# Patient Record
Sex: FEMALE | Race: WHITE | NOT HISPANIC OR LATINO | Employment: FULL TIME | URBAN - METROPOLITAN AREA
[De-identification: names, ages, dates, MRNs, and addresses within clinical notes are randomized per-mention and may not be internally consistent; named-entity substitution may affect disease eponyms.]

---

## 2023-03-11 ENCOUNTER — HOSPITAL ENCOUNTER (EMERGENCY)
Facility: HOSPITAL | Age: 58
Discharge: HOME | End: 2023-03-11
Admitting: EMERGENCY MEDICINE

## 2023-03-11 ENCOUNTER — APPOINTMENT (EMERGENCY)
Dept: CT IMAGING | Facility: HOSPITAL | Age: 58
End: 2023-03-11

## 2023-03-11 VITALS
RESPIRATION RATE: 16 BRPM | HEIGHT: 61 IN | SYSTOLIC BLOOD PRESSURE: 168 MMHG | TEMPERATURE: 97.6 F | WEIGHT: 169.09 LBS | BODY MASS INDEX: 31.93 KG/M2 | OXYGEN SATURATION: 99 % | HEART RATE: 66 BPM | DIASTOLIC BLOOD PRESSURE: 67 MMHG

## 2023-03-11 DIAGNOSIS — R42 VERTIGO: Primary | ICD-10-CM

## 2023-03-11 DIAGNOSIS — E86.0 DEHYDRATION: ICD-10-CM

## 2023-03-11 DIAGNOSIS — R11.2 NAUSEA AND VOMITING: ICD-10-CM

## 2023-03-11 LAB
ANION GAP SERPL CALCULATED.3IONS-SCNC: 13 MMOL/L (ref 4–13)
ATRIAL RATE: 59 BPM
ATRIAL RATE: 76 BPM
BASOPHILS # BLD AUTO: 0.07 THOUSANDS/ÂΜL (ref 0–0.1)
BASOPHILS NFR BLD AUTO: 0 % (ref 0–1)
BUN SERPL-MCNC: 30 MG/DL (ref 5–25)
CALCIUM SERPL-MCNC: 9.7 MG/DL (ref 8.4–10.2)
CARDIAC TROPONIN I PNL SERPL HS: 2 NG/L
CHLORIDE SERPL-SCNC: 106 MMOL/L (ref 96–108)
CO2 SERPL-SCNC: 20 MMOL/L (ref 21–32)
CREAT SERPL-MCNC: 1.18 MG/DL (ref 0.6–1.3)
EOSINOPHIL # BLD AUTO: 0.02 THOUSAND/ÂΜL (ref 0–0.61)
EOSINOPHIL NFR BLD AUTO: 0 % (ref 0–6)
ERYTHROCYTE [DISTWIDTH] IN BLOOD BY AUTOMATED COUNT: 12.2 % (ref 11.6–15.1)
GFR SERPL CREATININE-BSD FRML MDRD: 50 ML/MIN/1.73SQ M
GLUCOSE SERPL-MCNC: 120 MG/DL (ref 65–140)
HCT VFR BLD AUTO: 36.5 % (ref 34.8–46.1)
HGB BLD-MCNC: 11.9 G/DL (ref 11.5–15.4)
IMM GRANULOCYTES # BLD AUTO: 0.09 THOUSAND/UL (ref 0–0.2)
IMM GRANULOCYTES NFR BLD AUTO: 1 % (ref 0–2)
LYMPHOCYTES # BLD AUTO: 1.42 THOUSANDS/ÂΜL (ref 0.6–4.47)
LYMPHOCYTES NFR BLD AUTO: 9 % (ref 14–44)
MCH RBC QN AUTO: 31.1 PG (ref 26.8–34.3)
MCHC RBC AUTO-ENTMCNC: 32.6 G/DL (ref 31.4–37.4)
MCV RBC AUTO: 95 FL (ref 82–98)
MONOCYTES # BLD AUTO: 0.81 THOUSAND/ÂΜL (ref 0.17–1.22)
MONOCYTES NFR BLD AUTO: 5 % (ref 4–12)
NEUTROPHILS # BLD AUTO: 14.03 THOUSANDS/ÂΜL (ref 1.85–7.62)
NEUTS SEG NFR BLD AUTO: 85 % (ref 43–75)
NRBC BLD AUTO-RTO: 0 /100 WBCS
P AXIS: 49 DEGREES
P AXIS: 49 DEGREES
PLATELET # BLD AUTO: 313 THOUSANDS/UL (ref 149–390)
PMV BLD AUTO: 10.2 FL (ref 8.9–12.7)
POTASSIUM SERPL-SCNC: 3.4 MMOL/L (ref 3.5–5.3)
PR INTERVAL: 164 MS
PR INTERVAL: 176 MS
QRS AXIS: 77 DEGREES
QRS AXIS: 84 DEGREES
QRSD INTERVAL: 92 MS
QRSD INTERVAL: 94 MS
QT INTERVAL: 408 MS
QT INTERVAL: 488 MS
QTC INTERVAL: 459 MS
QTC INTERVAL: 483 MS
RBC # BLD AUTO: 3.83 MILLION/UL (ref 3.81–5.12)
SODIUM SERPL-SCNC: 139 MMOL/L (ref 135–147)
T WAVE AXIS: 41 DEGREES
T WAVE AXIS: 63 DEGREES
VENTRICULAR RATE: 59 BPM
VENTRICULAR RATE: 76 BPM
WBC # BLD AUTO: 16.44 THOUSAND/UL (ref 4.31–10.16)

## 2023-03-11 RX ORDER — HYDROCHLOROTHIAZIDE 25 MG/1
25 TABLET ORAL DAILY
COMMUNITY
Start: 2022-11-11 | End: 2023-11-11

## 2023-03-11 RX ORDER — LISINOPRIL 20 MG/1
20 TABLET ORAL DAILY
COMMUNITY
Start: 2022-11-11

## 2023-03-11 RX ORDER — ONDANSETRON 2 MG/ML
1 INJECTION INTRAMUSCULAR; INTRAVENOUS ONCE
Status: COMPLETED | OUTPATIENT
Start: 2023-03-11 | End: 2023-03-11

## 2023-03-11 RX ORDER — MECLIZINE HYDROCHLORIDE 25 MG/1
25 TABLET ORAL 3 TIMES DAILY PRN
Qty: 30 TABLET | Refills: 0 | Status: SHIPPED | OUTPATIENT
Start: 2023-03-11

## 2023-03-11 RX ORDER — ONDANSETRON 4 MG/1
4 TABLET, ORALLY DISINTEGRATING ORAL EVERY 6 HOURS PRN
Qty: 20 TABLET | Refills: 0 | Status: SHIPPED | OUTPATIENT
Start: 2023-03-11

## 2023-03-11 RX ORDER — ATORVASTATIN CALCIUM 20 MG/1
20 TABLET, FILM COATED ORAL DAILY
COMMUNITY
Start: 2022-11-11 | End: 2023-11-11

## 2023-03-11 RX ADMIN — SODIUM CHLORIDE 1000 ML: 0.9 INJECTION, SOLUTION INTRAVENOUS at 10:22

## 2023-03-11 NOTE — ED PROVIDER NOTES
History  Chief Complaint   Patient presents with   • Dizziness     Woke up at 6am with dizziness "states room was spinning" and then started vomiting  Given Zofran in ambulance  EMS states "no neuro deficits"     59 y o  female  with past medical history significant for hypertension presents to ED via EMS with chief complaint of dizziness and vomiting  Onset of symptoms reported as sudden, 6 am this morning  Location of symptoms reported as head  Quality is reported as dizziness, described as sensation of room spinning around followed by 2 episodes of vomiting  Severity is reported as initially moderate, currently mild  Associated symptoms: positive nausea, positive vomiting, positive dizziness  Denies palpitations, denies chest pain, denies sob, denies diarrhea, denies fevers, denies blurred vision or loss of vision, denies sinus pressure or congestion, denies sore throat or ear pain  Modifying factors: dizziness symptoms improved after vomiting and remaining still for a period of time  Context: Denies recent fall injury or trauma  No blood thinner use  Reports a similar episode 3 months ago for which she did not seek any medical evaluation and which resolved spontaneously  Social history reviewed: Remarkable for cigarette smoking  Denies alcohol use , past surgical history reviewed remarkable for hysterectomy  History provided by:  Patient and EMS personnel   used: No        Prior to Admission Medications   Prescriptions Last Dose Informant Patient Reported?  Taking?   atorvastatin (LIPITOR) 20 mg tablet   Yes Yes   Sig: Take 20 mg by mouth daily   hydrochlorothiazide (HYDRODIURIL) 25 mg tablet   Yes Yes   Sig: Take 25 mg by mouth daily   lisinopril (ZESTRIL) 20 mg tablet   Yes Yes   Sig: Take 20 mg by mouth daily      Facility-Administered Medications: None       Past Medical History:   Diagnosis Date   • Hypertension        Past Surgical History:   Procedure Laterality Date • HYSTERECTOMY         History reviewed  No pertinent family history  I have reviewed and agree with the history as documented  E-Cigarette/Vaping     E-Cigarette/Vaping Substances     Social History     Tobacco Use   • Smoking status: Former     Types: Cigarettes   • Smokeless tobacco: Never   Substance Use Topics   • Alcohol use: Not Currently   • Drug use: Never       Review of Systems   Constitutional: Negative for diaphoresis and fever  HENT: Negative for congestion  Eyes: Negative for photophobia and visual disturbance  Respiratory: Negative for cough, chest tightness, shortness of breath and stridor  Cardiovascular: Negative for chest pain  Gastrointestinal: Positive for nausea and vomiting  Negative for abdominal distention, abdominal pain, constipation and diarrhea  Musculoskeletal: Negative for gait problem  Skin: Negative for rash  Neurological: Positive for dizziness and light-headedness  Negative for seizures, syncope, facial asymmetry, weakness and numbness  All other systems reviewed and are negative  Physical Exam  Physical Exam  Vitals and nursing note reviewed  Constitutional:       General: She is not in acute distress  Appearance: Normal appearance  Comments: /69   Pulse 65   Temp 97 6 °F (36 4 °C) (Oral)   Resp 20   Ht 5' 1" (1 549 m)   Wt 76 7 kg (169 lb 1 5 oz)   SpO2 98%   BMI 31 95 kg/m²      HENT:      Head: Normocephalic and atraumatic  Right Ear: Tympanic membrane, ear canal and external ear normal       Left Ear: Tympanic membrane, ear canal and external ear normal       Nose: Nose normal       Mouth/Throat:      Mouth: Mucous membranes are moist       Pharynx: No oropharyngeal exudate or posterior oropharyngeal erythema  Eyes:      General: No scleral icterus  Right eye: No discharge  Left eye: No discharge  Conjunctiva/sclera: Conjunctivae normal       Comments: Fatigable right gaze nystagmus  Cardiovascular:      Rate and Rhythm: Regular rhythm  Bradycardia present  Pulses: Normal pulses  Pulmonary:      Effort: Pulmonary effort is normal  No respiratory distress  Breath sounds: Normal breath sounds  No stridor  No rhonchi  Abdominal:      Palpations: There is no mass  Tenderness: There is no abdominal tenderness  There is no guarding or rebound  Musculoskeletal:         General: No tenderness, deformity or signs of injury  Normal range of motion  Cervical back: Normal range of motion and neck supple  No rigidity or tenderness  Skin:     General: Skin is dry  Capillary Refill: Capillary refill takes less than 2 seconds  Coloration: Skin is not jaundiced  Findings: No erythema or rash  Neurological:      General: No focal deficit present  Mental Status: She is alert and oriented to person, place, and time  Mental status is at baseline  Cranial Nerves: No cranial nerve deficit  Sensory: No sensory deficit  Motor: No weakness  Gait: Gait normal    Psychiatric:         Mood and Affect: Mood normal          Behavior: Behavior normal          Thought Content:  Thought content normal          Vital Signs  ED Triage Vitals   Temperature Pulse Respirations Blood Pressure SpO2   03/11/23 0943 03/11/23 0943 03/11/23 0943 03/11/23 0943 03/11/23 0943   97 6 °F (36 4 °C) 59 16 (!) 175/74 100 %      Temp Source Heart Rate Source Patient Position - Orthostatic VS BP Location FiO2 (%)   03/11/23 0943 03/11/23 1104 03/11/23 1104 03/11/23 1104 --   Oral Monitor Lying Right arm       Pain Score       --                  Vitals:    03/11/23 0943 03/11/23 0945 03/11/23 1000 03/11/23 1104   BP: (!) 175/74 (!) 175/74 168/69 162/72   Pulse: 59 60 65 69   Patient Position - Orthostatic VS:    Lying         Visual Acuity  Visual Acuity    Flowsheet Row Most Recent Value   L Pupil Size (mm) 2   R Pupil Size (mm) 2          ED Medications  Medications ondansetron (FOR EMS ONLY) (ZOFRAN) 4 mg/2 mL injection 4 mg (0 mg Does not apply Given to EMS 3/11/23 0945)   sodium chloride 0 9 % bolus 1,000 mL (1,000 mL Intravenous New Bag 3/11/23 1022)       Diagnostic Studies  Results Reviewed     Procedure Component Value Units Date/Time    HS Troponin I 2hr [[id number]]     Lab Status: No result Specimen: Blood     HS Troponin 0hr (reflex protocol) [[id number]]  (Normal) Collected: 03/11/23 1019    Lab Status: Final result Specimen: Blood from Arm, Right Updated: 03/11/23 1058     hs TnI 0hr 2 ng/L     Basic metabolic panel [[id number]]  (Abnormal) Collected: 03/11/23 1019    Lab Status: Final result Specimen: Blood from Arm, Right Updated: 03/11/23 1050     Sodium 139 mmol/L      Potassium 3 4 mmol/L      Chloride 106 mmol/L      CO2 20 mmol/L      ANION GAP 13 mmol/L      BUN 30 mg/dL      Creatinine 1 18 mg/dL      Glucose 120 mg/dL      Calcium 9 7 mg/dL      eGFR 50 ml/min/1 73sq m     Narrative:      [place] guidelines for Chronic Kidney Disease (CKD):   •  Stage 1 with normal or high GFR (GFR > 90 mL/min/1 73 square meters)  •  Stage 2 Mild CKD (GFR = 60-89 mL/min/1 73 square meters)  •  Stage 3A Moderate CKD (GFR = 45-59 mL/min/1 73 square meters)  •  Stage 3B Moderate CKD (GFR = 30-44 mL/min/1 73 square meters)  •  Stage 4 Severe CKD (GFR = 15-29 mL/min/1 73 square meters)  •  Stage 5 End Stage CKD (GFR <15 mL/min/1 73 square meters)  Note: GFR calculation is accurate only with a steady state creatinine    CBC and differential [[id number]]  (Abnormal) Collected: 03/11/23 1019    Lab Status: Final result Specimen: Blood from Arm, Right Updated: 03/11/23 1031     WBC 16 44 Thousand/uL      RBC 3 83 Million/uL      Hemoglobin 11 9 g/dL      Hematocrit 36 5 %      MCV 95 fL      MCH 31 1 pg      MCHC 32 6 g/dL      RDW 12 2 %      MPV 10 2 fL      Platelets 091 Thousands/uL      nRBC 0 /100 WBCs      Neutrophils Relative 85 %      Immat GRANS % 1 %      Lymphocytes Relative 9 %      Monocytes Relative 5 %      Eosinophils Relative 0 %      Basophils Relative 0 %      Neutrophils Absolute 14 03 Thousands/µL      Immature Grans Absolute 0 09 Thousand/uL      Lymphocytes Absolute 1 42 Thousands/µL      Monocytes Absolute 0 81 Thousand/µL      Eosinophils Absolute 0 02 Thousand/µL      Basophils Absolute 0 07 Thousands/µL                  CT head without contrast   Final Result by Genie 6, DO (03/11 1049)      No acute intracranial abnormality  Workstation performed: [id number]                    Procedures  ECG 12 Lead Documentation Only    Date/Time: 3/11/2023 9:43 AM  Performed by: Shen Hernandez PA-C  Authorized by: Shen Hernandez PA-C     Indications / Diagnosis:  Dizziness  ECG reviewed by me, the ED Provider: yes    Patient location:  ED  Previous ECG:     Previous ECG:  Unavailable    Comparison to cardiac monitor: Yes    Interpretation:     Interpretation: normal    Rate:     ECG rate:  59    ECG rate assessment: bradycardic    Rhythm:     Rhythm: sinus bradycardia    Ectopy:     Ectopy: none    QRS:     QRS axis:  Normal    QRS intervals:  Normal  Conduction:     Conduction: normal    ST segments:     ST segments:  Normal  T waves:     T waves: normal    Comments:                   ED Course  ED Course as of 03/11/23 1139   Sat Mar 11, 2023   1123 CBC and differential(!)  Reviewed  White blood cell count elevated at 16 4  Nonspecific  May represent infection, volume contraction or stress leukocytosis  Hemoglobin of 11 and hematocrit of 36 are normal   No anemia  6557 Basic metabolic panel(!)  Reviewed  Potassium mildly low at 3 4  BUN mildly elevated at 30  Creatinine 1 1 is normal   GFR mildly depressed at 50    1124 HS Troponin 0hr (reflex protocol)  Reviewed  2   Negative for ACS   1124 CT head without contrast  Reviewed  Interpretation: No acute intracranial abnormality  HEART Risk Score    Flowsheet Row Most Recent Value   Heart Score Risk Calculator    History 0 Filed at: 03/11/2023 1138   ECG 0 Filed at: 03/11/2023 1138   Age 1 Filed at: 03/11/2023 1138   Risk Factors 1 Filed at: 03/11/2023 1138   Troponin 0 Filed at: 03/11/2023 1138   HEART Score 2 Filed at: 03/11/2023 1138                                      Medical Decision Making  ddx includes but is not limited to peripheral vertigo, BPV, labyrinthitis, meniere disease, vestibule neuronitis, acoustic neuroma, cerebellar hemorrhage, vertebrobasilar insufficiency, tumor, MS, CVA, TIA, cardiac arrhythmia, anemia, [place], ACS, hypoglycemia, vasovagal syndrome, electrolyte abnormality, dehydration, infection  ED Plan workup including:  ekg to evaluate for arrhythmias, ACS,   Telemetry to monitor for arrhythmias  ct head to evaluate for bleeding  IV fluids  BMP for electrolytes  Currently asymptomatic- monitor/reevaluate    The cardiac monitor revealed sinus bradycardia as interpreted by me  The cardiac monitor was ordered secondary to history of lightheadedness and to monitor patient for potential dysrhythmia  EKG interpretation: Sinus bradycardia, rate 59  No ST elevations  ED results:   I have reviewed the patient's vital signs, nursing notes, and other relevant ancillary testing/information  I have had a detailed discussion with the patient regarding the historical points, examination findings, and any diagnostic results    ED results: labs reviewed  CT head, CXR and ekg reviewed  ED summary: 59 year female with history of hypertension presents emergency department after she woke up this morning with an episode of transient dizziness followed by nausea and vomiting  Symptoms resolved on arrival to ED  Well appearing on physical exam, mild right gaze nystagmus, otherwise normal neurological exam   No head trauma    Now reports that she took a long car ride yesterday and did not drink anything and endorses may be dehydrated  ED workup negative  Ambulates with stable gait in ED, no vomiting, normal neurological exam in ED  Mild leukocytosis may be stress induced from vomiting or related to volume contraction/dehydration  Elevated BUN consistent with mild dehydration, no renal failure  Troponin/ACS workup negative  Delta troponin not indicated based on time of onset of symptoms greater than 3 hours and initial troponin less than 2  Dx: 1) vertigo, 2) nausea and vomiting secondary to vertigo, 3) dehydration, 4) hypertension/stable  Tx:   -encourage fluids at home  -zofran for nausea/vomiting  -antivert for recurrent vertigo  -rest  -f/u pcp for recheck 3-5 days  -f/u neurology 1-2 weeks as needed    I discussed diagnosis and treatment plan with patient at bedside  Extended discussion with patient regarding the diagnosis, pathophysiology, expectant coarse and treatment plan  Instructed to follow up with pcp and recommended specialist in 3-5 days  Reviewed reasons to return to ed  Patient verbalized understanding of diagnosis and agreement with discharge plan of care as well as understanding of reasons to return to ed  Amount and/or Complexity of Data Reviewed  Labs: ordered  Decision-making details documented in ED Course  Radiology: ordered  Decision-making details documented in ED Course            Disposition  Final diagnoses:   Vertigo   Nausea and vomiting   Dehydration     Time reflects when diagnosis was documented in both MDM as applicable and the Disposition within this note     Time User Action Codes Description Comment    3/11/2023 11:30 AM Daiana De La Cruz [R42] Vertigo     3/11/2023 11:30 AM Daiana Potter Add [R11 2] Nausea and vomiting     3/11/2023 11:30 AM Daiana Potter Add [E86 0] Dehydration       ED Disposition     ED Disposition   Discharge    Condition   Stable    Date/Time   Sat Mar 11, 2023 11:30 AM    Comment   Rylan Marino discharge to home/self care                Follow-up Information     Follow up With Specialties Details Why Contact Info Additional Information    [address] [facility] Emergency Department Emergency Medicine Go to  If symptoms worsen [place] [address] [facility] [address] [facility] Emergency Department, [address]", [place], [address] [facility] Rhianna Gomez MD Family Medicine Call in 3 days for further evaluation of symptoms [address] [facility],[address]  [address]       Summer Moore MD Neurology Call in 1 week follow up as needed [address] [place] [address]  [phone]             Patient's Medications   Discharge Prescriptions    MECLIZINE (ANTIVERT) 25 MG TABLET    Take 1 tablet (25 mg total) by mouth 3 (three) times a day as needed for dizziness       Start Date: 3/11/2023 End Date: --       Order Dose: 25 mg       Quantity: 30 tablet    Refills: 0    ONDANSETRON (ZOFRAN-ODT) 4 MG DISINTEGRATING TABLET    Take 1 tablet (4 mg total) by mouth every 6 (six) hours as needed for nausea or vomiting for up to 20 doses       Start Date: 3/11/2023 End Date: --       Order Dose: 4 mg       Quantity: 20 tablet    Refills: 0       No discharge procedures on file      PDMP Review     None          ED Provider  Electronically Signed by           Lauren Napoles PA-C  03/11/23 4224